# Patient Record
Sex: FEMALE | Race: WHITE
[De-identification: names, ages, dates, MRNs, and addresses within clinical notes are randomized per-mention and may not be internally consistent; named-entity substitution may affect disease eponyms.]

---

## 2019-06-02 ENCOUNTER — HOSPITAL ENCOUNTER (EMERGENCY)
Dept: HOSPITAL 75 - ER | Age: 59
Discharge: HOME | End: 2019-06-02
Payer: SELF-PAY

## 2019-06-02 VITALS — WEIGHT: 220 LBS | HEIGHT: 66 IN | BODY MASS INDEX: 35.36 KG/M2

## 2019-06-02 VITALS — DIASTOLIC BLOOD PRESSURE: 64 MMHG | SYSTOLIC BLOOD PRESSURE: 138 MMHG

## 2019-06-02 DIAGNOSIS — Z88.6: ICD-10-CM

## 2019-06-02 DIAGNOSIS — F17.200: ICD-10-CM

## 2019-06-02 DIAGNOSIS — L02.811: Primary | ICD-10-CM

## 2019-06-02 PROCEDURE — 87077 CULTURE AEROBIC IDENTIFY: CPT

## 2019-06-02 PROCEDURE — 87070 CULTURE OTHR SPECIMN AEROBIC: CPT

## 2019-06-02 PROCEDURE — 99283 EMERGENCY DEPT VISIT LOW MDM: CPT

## 2019-06-02 PROCEDURE — 87205 SMEAR GRAM STAIN: CPT

## 2019-06-02 PROCEDURE — 87186 SC STD MICRODIL/AGAR DIL: CPT

## 2019-06-02 NOTE — ED INTEGUMENTARY GENERAL
General


Chief Complaint:  Bite-Animal/Human/Insect


Stated Complaint:  SPIDER BITE R SIDE OF HEAD


Nursing Triage Note:  


WOUND TO RIGHT SIDE OF HEAD SINCE THURSDAY.  THINKS IT IS A SPIDER BITE.


Source:  patient


Exam Limitations:  no limitations





History of Present Illness


Date Seen by Provider:  Jun 2, 2019


Time Seen by Provider:  16:00


Initial Comments


58-year-old female who presents to emergency room with complaints of a wound to 

the right side of her scalp that she noticed Thursday. She reports that it 

didn't come to ahead and she "popped it". There is surrounding edema but no area

of fluctuation.


Location:  scalp


Associated Symptoms:  denies symptoms





Allergies and Home Medications


Allergies


Coded Allergies:  


     aspirin (Unverified  Allergy, Mild, 11/17/09)





Home Medications


Sulfamethoxazole/Trimethoprim 1 Each Tablet, 1 EACH PO BID


   Prescribed by: ROZINA HILTON on 6/2/19 0398





Patient Home Medication List


Home Medication List Reviewed:  Yes





Review of Systems


Review of Systems


Constitutional:  see HPI; No chills, No fever


Skin:  see HPI, other (abscess to the right side of scalp.)





All Other Systems Reviewed


Negative Unless Noted:  Yes





Past Medical-Social-Family Hx


Past Med/Social Hx:  Reviewed Nursing Past Med/Soc Hx


Patient Social History


Alcohol Use:  Rarely Uses


Recreational Drug Use:  No (IN THE PAST)


Smoking Status:  Current Everyday Smoker


Recent Foreign Travel:  No


Contact w/Someone Who Travel:  No


Recent Infectious Disease Expo:  No





Past Medical History


Surgeries:  Yes (EPTOPIC)


Orthopedic


Respiratory:  No


Cardiac:  No


Neurological:  No


Genitourinary:  No


Gastrointestinal:  No


Musculoskeletal:  No


Endocrine:  No


HEENT:  No


Cancer:  No


Psychosocial:  No





Family Medical History


Reviewed Nursing Family Hx





Physical Exam


Vital Signs





Vital Signs - First Documented








 6/2/19





 15:51


 


Temp 98.5


 


Pulse 80


 


Resp 16


 


B/P (MAP) 138/64 (88)


 


Pulse Ox 97


 


O2 Delivery Room Air





Capillary Refill : Less Than 3 Seconds


General Appearance:  WD/WN, no apparent distress


Cardiovascular:  normal peripheral pulses, regular rate, rhythm, no edema, no 

gallop, no JVD, no murmur


Respiratory:  chest non-tender, lungs clear, normal breath sounds, no respi

ratory distress, no accessory muscle use


Extremities:  normal capillary refill


Neurologic/Psychiatric:  alert, normal mood/affect, oriented x 3


Skin:  normal color, warm/dry


Skin Problem Location:  scalp (right side)


Skin Problem Character:  abscess, erythema, thickening, warm





Progress/Results/Core Measures


Results/Orders


Micro Results





Microbiology


6/2/19 Gram Stain - Final, Complete


         


6/2/19 Wound Culture - Final, Complete


         Staphylococcus aureus





My Orders





Orders - ROZINA HILTON


Wound Culture (6/2/19 16:05)


Sulfamethoxazole/Trimet Ds Tab (Bactrim (6/2/19 16:15)





Vital Signs/I&O











 6/2/19 6/2/19





 15:51 16:19


 


Temp 98.5 98.5


 


Pulse 80 80


 


Resp 16 16


 


B/P (MAP) 138/64 (88) 138/64 (88)


 


Pulse Ox 97 97


 


O2 Delivery Room Air 














Blood Pressure Mean:                    88











Departure


Impression





   Primary Impression:  


   Skin abscess


Disposition:  01 HOME, SELF-CARE


Condition:  Stable/Unchanged





Departure-Patient Inst.


Referrals:  


MARLA DE LA CRUZ MD (PCP/Family)


Primary Care Physician


Patient Instructions:  Skin Abscess





Add. Discharge Instructions:  


Take medication as directed. Allow the area to continue to drain. Follow-up with

your primary care provider within 1 week for recheck. Return back to the 

emergency room for worsening symptoms or concerns as needed. All discharge 

instructions reviewed with patient and/or family. Voiced understanding.


Scripts


Sulfamethoxazole/Trimethoprim (Bactrim Ds Tablet) 1 Each Tablet


1 EACH PO BID for 7 Days, #14 TAB


   Prov: ROZINA HILTON         6/2/19











ROZINA HILTON                   Jun 2, 2019 16:15

## 2022-09-06 ENCOUNTER — HOSPITAL ENCOUNTER (EMERGENCY)
Dept: HOSPITAL 75 - ER | Age: 62
Discharge: HOME | End: 2022-09-06
Payer: SELF-PAY

## 2022-09-06 VITALS — HEIGHT: 63.78 IN | WEIGHT: 242.51 LBS | BODY MASS INDEX: 41.91 KG/M2

## 2022-09-06 VITALS — DIASTOLIC BLOOD PRESSURE: 57 MMHG | SYSTOLIC BLOOD PRESSURE: 121 MMHG

## 2022-09-06 DIAGNOSIS — F17.210: ICD-10-CM

## 2022-09-06 DIAGNOSIS — U07.1: Primary | ICD-10-CM

## 2022-09-06 PROCEDURE — 99283 EMERGENCY DEPT VISIT LOW MDM: CPT

## 2022-09-06 PROCEDURE — 87636 SARSCOV2 & INF A&B AMP PRB: CPT

## 2022-09-06 NOTE — ED COUGH/URI
General


Chief Complaint:  COVID19 Suspect/Confirmed


Stated Complaint:  FEVER,HEADACHE,BODY ACHE,NAUSEA


Nursing Triage Note:  


PT STATES SHE WAS EXPOSED TO COVID FRIDAY, S/S STARTED SATURDAY WITH HEADACHE, 


BACK PAIN, FEVER, TYLENOL TAKEN ABOUT AN HR PTA. SMOKES, STATES SHE HAD COVID 


BAD IN THE PAST


Source:  patient


Exam Limitations:  no limitations





History of Present Illness


Date Seen by Provider:  Sep 6, 2022


Time Seen by Provider:  07:19


Initial Comments


Patient to the ER by private conveyance chief complaint since Saturday, 3 days 

ago she started experiencing headache, malaise, body aches, cough and nausea.  

She says her significant other was diagnosed on Sunday with COVID-19.  She has 

no kidney disease or heart disease.  She is down to smoking 1 pack of cigarettes

per week.





Allergies and Home Medications


Allergies


Coded Allergies:  


     aspirin (Unverified  Allergy, Mild, 11/17/09)





Patient Home Medication List


Home Medication List Reviewed:  Yes


Sulfamethoxazole/Trimethoprim (Bactrim Ds Tablet) 1 Each Tablet, 1 EACH PO BID


   Prescribed by: ROZINA HILTON on 6/2/19 1615





Review of Systems


Review of Systems


Constitutional:  chills, fever, malaise


EENTM:  ear pain (r); No ear discharge


Respiratory:  cough; No phlegm, No short of breath, No wheezing


Cardiovascular:  No chest pain, No edema


Gastrointestinal:  No abdominal pain, No constipation, No diarrhea; nausea; No 

vomiting


Genitourinary:  No discharge, No dysuria


Musculoskeletal:  No back pain, No joint pain


Psychiatric/Neurological:  Denies Anxiety, Denies Depressed





All Other Systems Reviewed


Negative Unless Noted:  Yes





Past Medical-Social-Family Hx


Patient Social History


Tobacco Use?:  Yes


Tobacco type used:  Cigarettes


Smoking Status:  Current Everyday Smoker


Substance use?:  No


Alcohol Use?:  No





Immunizations Up To Date


Third COVID19 Vaccination Date:  YES





Past Medical History


Surgery/Hospitalization HX:  


PLATE AND SCREWS IN RT ANKLE


Surgeries:  Yes (EPTOPIC)


Orthopedic


Respiratory:  No


Cardiac:  No


Neurological:  No


Genitourinary:  No


Gastrointestinal:  No


Musculoskeletal:  No


Endocrine:  No


HEENT:  No


Cancer:  No


Psychosocial:  No





Physical Exam





Vital Signs - First Documented








 9/6/22





 07:18


 


Temp 36.8


 


Pulse 84


 


Resp 22


 


B/P (MAP) 131/88 (102)


 


Pulse Ox 95


 


O2 Delivery Room Air





Capillary Refill : Less Than 3 Seconds


Height: 5'6.00"


Weight: 220lbs. oz. 99.609026is; 41.00 BMI


Method:Stated


General Appearance:  WD/WN, no apparent distress


Eyes:  Bilateral Eye Normal Inspection, Bilateral Eye PERRL, Bilateral Eye EOMI


HEENT:  PERRL/EOMI, normal ENT inspection, TMs normal, pharynx normal


Neck:  full range of motion, supple, normal inspection


Respiratory:  lungs clear, normal breath sounds, no respiratory distress, no 

accessory muscle use


Cardiovascular:  normal peripheral pulses, regular rate, rhythm


Gastrointestinal:  non tender, soft


Extremities:  non-tender, normal inspection, normal capillary refill


Neurologic/Psychiatric:  alert, normal mood/affect, oriented x 3


Skin:  normal color, diaphoresis





Progress/Results/Core Measures


Suspected Sepsis


SIRS


Temperature: 


Pulse: 84 


Respiratory Rate: 22


 


Blood Pressure 131 /88 


Mean: 102





Results/Orders


Lab Results





Laboratory Tests








Test


 9/6/22


07:25 Range/Units


 


 


Influenza Type A (RT-PCR) Not Detected  Not Detecte  


 


Influenza Type B (RT-PCR) Not Detected  Not Detecte  


 


SARS-CoV-2 RNA (RT-PCR) Detected H Not Detecte  








My Orders





Orders - MANISHA ZIMMERMAN


Covid 19 Inhouse Test (9/6/22 07:33)


Influenza A And B By Pcr (9/6/22 07:33)





Vital Signs/I&O











 9/6/22





 07:18


 


Temp 36.8


 


Pulse 84


 


Resp 22


 


B/P (MAP) 131/88 (102)


 


Pulse Ox 95


 


O2 Delivery Room Air





Capillary Refill : Less Than 3 Seconds








Blood Pressure Mean:                    102








Progress Note :  


   Time:  08:14


Progress Note


Well-appearing adult.  We will put her on Paxlovid.  We did discuss that it is 

authorized under an emergency use authorization by the FDA and the risks and 

benefits and the patient consents to use it.  We will also provide her with some

nausea medicine and return precautions.  Toradol IM for her headache





Departure


Impression





   Primary Impression:  


   COVID-19


Disposition:  01 HOME, SELF-CARE


Condition:  Stable





Departure-Patient Inst.


Decision time for Depature:  08:16


Referrals:  


MARLA DE LA CRUZ MD (PCP/Family)


Primary Care Physician


Patient Instructions:  COVID-19 (DC), Nirmatrelvir and Ritonavir FDA Fact Sheet





Add. Discharge Instructions:  


Paxlovid twice a day take as directed with food.


Drink lots of fluids.


Ondansetron 1 or 2 tablets every 6 hours as needed for nausea and/or vomiting.


Take lots of rest.


Return to the ER for significant worsening symptoms, especially oxygen 

saturations below 90%.


Tylenol 1000 mg every 8 hours as needed for pain.


Ibuprofen 800 mg every 8 hours as needed for pain.


All discharge instructions reviewed with patient and/or family. Voiced 

understanding.


Scripts


Ondansetron (Ondansetron Odt) 4 Mg Tab.rapdis


4 MG PO Q6H PRN for NAUSEA/VOMITING, #12 TAB 0 Refills


   Prov: MANISHA ZIMMERMAN         9/6/22











MANISHA ZIMMERMAN                  Sep 6, 2022 08:15